# Patient Record
Sex: FEMALE | URBAN - METROPOLITAN AREA
[De-identification: names, ages, dates, MRNs, and addresses within clinical notes are randomized per-mention and may not be internally consistent; named-entity substitution may affect disease eponyms.]

---

## 2020-10-02 ENCOUNTER — OUT OF OFFICE VISIT (OUTPATIENT)
Dept: URBAN - METROPOLITAN AREA MEDICAL CENTER 41 | Facility: MEDICAL CENTER | Age: 25
End: 2020-10-02
Payer: COMMERCIAL

## 2020-10-02 DIAGNOSIS — R74.8 ABNORMAL ALKALINE PHOSPHATASE TEST: ICD-10-CM

## 2020-10-02 DIAGNOSIS — K83.8 ACQUIRED DILATION OF COMMON BILE DUCT: ICD-10-CM

## 2020-10-02 DIAGNOSIS — K80.20 ASYMPTOMATIC CHOLELITHIASIS: ICD-10-CM

## 2020-10-02 DIAGNOSIS — R10.11 ABDOMINAL BURNING SENSATION IN RIGHT UPPER QUADRANT: ICD-10-CM

## 2020-10-02 PROCEDURE — 99232 SBSQ HOSP IP/OBS MODERATE 35: CPT | Performed by: INTERNAL MEDICINE

## 2020-10-02 PROCEDURE — 99254 IP/OBS CNSLTJ NEW/EST MOD 60: CPT | Performed by: INTERNAL MEDICINE

## 2020-10-05 ENCOUNTER — TELEPHONE ENCOUNTER (OUTPATIENT)
Dept: URBAN - METROPOLITAN AREA CLINIC 92 | Facility: CLINIC | Age: 25
End: 2020-10-05

## 2020-10-13 ENCOUNTER — LAB OUTSIDE AN ENCOUNTER (OUTPATIENT)
Dept: URBAN - METROPOLITAN AREA CLINIC 118 | Facility: CLINIC | Age: 25
End: 2020-10-13

## 2020-10-14 LAB
A/G RATIO: 1.3
ALBUMIN: 4.1
ALKALINE PHOSPHATASE: 87
ALT (SGPT): 49
AST (SGOT): 23
BILIRUBIN, TOTAL: <0.2
BUN/CREATININE RATIO: 11
BUN: 11
CALCIUM: 9.3
CARBON DIOXIDE, TOTAL: 23
CHLORIDE: 103
CREATININE: 0.96
EGFR IF AFRICN AM: 94
EGFR IF NONAFRICN AM: 81
GLOBULIN, TOTAL: 3.1
GLUCOSE: 93
POTASSIUM: 4.5
PROTEIN, TOTAL: 7.2
SODIUM: 140

## 2020-10-19 ENCOUNTER — TELEPHONE ENCOUNTER (OUTPATIENT)
Dept: URBAN - METROPOLITAN AREA CLINIC 92 | Facility: CLINIC | Age: 25
End: 2020-10-19

## 2020-10-26 ENCOUNTER — OFFICE VISIT (OUTPATIENT)
Dept: URBAN - METROPOLITAN AREA CLINIC 118 | Facility: CLINIC | Age: 25
End: 2020-10-26
Payer: COMMERCIAL

## 2020-10-26 ENCOUNTER — DASHBOARD ENCOUNTERS (OUTPATIENT)
Age: 25
End: 2020-10-26

## 2020-10-26 DIAGNOSIS — Z90.49 HISTORY OF CHOLECYSTECTOMY: ICD-10-CM

## 2020-10-26 DIAGNOSIS — R74.8 ELEVATED LIVER ENZYMES: ICD-10-CM

## 2020-10-26 PROBLEM — 428882003: Status: ACTIVE | Noted: 2020-10-26

## 2020-10-26 PROCEDURE — G9903 PT SCRN TBCO ID AS NON USER: HCPCS | Performed by: INTERNAL MEDICINE

## 2020-10-26 PROCEDURE — G8427 DOCREV CUR MEDS BY ELIG CLIN: HCPCS | Performed by: INTERNAL MEDICINE

## 2020-10-26 PROCEDURE — 99213 OFFICE O/P EST LOW 20 MIN: CPT | Performed by: INTERNAL MEDICINE

## 2020-10-26 PROCEDURE — G8417 CALC BMI ABV UP PARAM F/U: HCPCS | Performed by: INTERNAL MEDICINE

## 2020-10-26 PROCEDURE — G8482 FLU IMMUNIZE ORDER/ADMIN: HCPCS | Performed by: INTERNAL MEDICINE

## 2020-10-26 NOTE — HPI-TODAY'S VISIT:
This is a 26 yo female here for a hospital follow up.  patient was admitted at [facility] early 10/2020 for abdominal pain. Found to have gallstones and elevated liver enzymes. US showed mild dilated of CBD at 8 mm. MRCP was ordered but delayed. She went for CCK with plans for IOC but IOC not done due to small cystic duct. She has been doing well since her surgery. No abdominal pain.  Recent repeat labs showed normal AST and ALT at 49.

## 2020-10-27 LAB
A/G RATIO: 1.3
ALBUMIN: 4
ALKALINE PHOSPHATASE: 85
ALT (SGPT): 30
AST (SGOT): 19
BILIRUBIN, TOTAL: 0.2
BUN/CREATININE RATIO: 11
BUN: 10
CALCIUM: 9.2
CARBON DIOXIDE, TOTAL: 22
CHLORIDE: 106
CREATININE: 0.87
EGFR IF AFRICN AM: 106
EGFR IF NONAFRICN AM: 92
GLOBULIN, TOTAL: 3
GLUCOSE: 93
POTASSIUM: 4.5
PROTEIN, TOTAL: 7
SODIUM: 142

## 2020-12-21 ENCOUNTER — OFFICE VISIT (OUTPATIENT)
Dept: URBAN - METROPOLITAN AREA CLINIC 118 | Facility: CLINIC | Age: 25
End: 2020-12-21

## 2021-10-29 ENCOUNTER — OFFICE VISIT (OUTPATIENT)
Dept: URBAN - METROPOLITAN AREA TELEHEALTH 2 | Facility: TELEHEALTH | Age: 26
End: 2021-10-29